# Patient Record
(demographics unavailable — no encounter records)

---

## 2024-11-05 NOTE — ASSESSMENT
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 Mendez on 9/11/24 (most recent 10/22/24)  MRV Head 10/2024 reveals patent stent, no new stenosis  Will Continue Routine Follow Up.    Plan: Continue ASA 325mg Daily MRV Head w/wo in 8/2025 Follow Up with Me After Imaging

## 2024-11-05 NOTE — REASON FOR VISIT
[Follow-Up: _____] : a [unfilled] follow-up visit [Home] : at home, [unfilled] , at the time of the visit. [Medical Office: ([facility])___] : at the medical office located in  [Patient] : the patient [Self] : self

## 2024-11-05 NOTE — HISTORY OF PRESENT ILLNESS
[FreeTextEntry1] : Ms. CHAWLA is a pleasant 23yo female who presents today after successful venous sinus stenting on 8/9/24.  Her pulsatile tinnitus has completely resolved.  Her headaches are much improved from prior to the procedure, some residual.    She saw Dr. Mendez on 10/22/24 and did not have papilledema.    She remains compliant with ASA 325mg daily.

## 2025-06-26 NOTE — PHYSICAL EXAM
[Sclera] : the sclera and conjunctiva were normal [Auscultation Breath Sounds / Voice Sounds] : lungs were clear to auscultation bilaterally [Heart Sounds] : normal S1 and S2 [Heart Sounds Gallop] : no gallops [Murmurs] : no murmurs [Abdomen Soft] : soft [Abdomen Tenderness] : non-tender [Cervical Lymph Nodes Enlarged Posterior Bilaterally] : posterior cervical [Cervical Lymph Nodes Enlarged Anterior Bilaterally] : anterior cervical [Supraclavicular Lymph Nodes Enlarged Bilaterally] : supraclavicular [FreeTextEntry1] : pt able to abduct 1st digit to touch wrist, pt able to flex 5th digit over 90s degrees; elbows extension over 180 degrees, pt able to touch ground w palsm of hands without flexing knees (knees in extension)  [] : no rash [Skin Lesions] : no skin lesions [Oriented To Time, Place, And Person] : oriented to person, place, and time

## 2025-06-26 NOTE — CONSULT LETTER
[Dear  ___] : Dear  [unfilled], [Consult Letter:] : I had the pleasure of evaluating your patient, [unfilled]. [Consult Closing:] : Thank you very much for allowing me to participate in the care of this patient.  If you have any questions, please do not hesitate to contact me. [Sincerely,] : Sincerely, [FreeTextEntry2] : Dr. Jeimy Chapin [address] [place] [address] [FreeTextEntry3] : Miles Patrick MD

## 2025-06-26 NOTE — HISTORY OF PRESENT ILLNESS
[FreeTextEntry1] : 23 F here for consultation  Pt reports mm and joint pains for 2 years.  Pt reports maybe she has stiffness, but very mild. No swelling.  Time of day does not affect intensity of pain. Pt says activities mostly make pain better.  Pt has tried mm relaxer, and steroid packs. Also anti-inflammatories. Pt has done PT, which has not helped.   Pt report positive RHONDA.   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 Pt recently went to genetic counseling. Pt says she is able to dislocate joints. NO easy bruising. NO vascular issues. No visual symptoms  Pt has hx of IIH has gotten surgery for this. Pt reports w dx of this, she got body pain.  Pt has had "random infections"    No fevers, h/a, rashes, hair loss, oral ulcers, epistaxis, sinusitis,  swollen glands, dry mouth, dry eyes, CP, SOB, cough, vision changes, abdominal pain, GERD, n/v/d, blood in stool or urine, focal weakness, sensory loss,  Raynaud's, weight loss.   OB: has not been pregnant

## 2025-06-26 NOTE — ASSESSMENT
[FreeTextEntry1] : 22 y/o F w positive RHONDA =arthralgias, myalgias =stiffness? no swelling =hypermobile; pt w [place] score 7 =reported positive RHONDA  Explained that positive RHONDA is a general test, which is found in high titers in autoimmune dz. Explained that positive RHONDA is not diagnostic of AI dz, and patient wout autoimmune dz can also have high RHONDA. Individuals w high RHONDA are at higher risk of developing AI dz however. Explained that positive RHONDA is a general test, which is found in high titers in autoimmune dz. Explained that positive RHONDA is not diagnostic of AI dz, and patient wout autoimmune dz can also have high RHONDA. Individuals w high RHONDA are at higher risk of developing AI dz however.   I do not think pt has AI or inflammatory condition but will send subserologies.   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 I advised pt to complete genetic counseling (pt currently declining). Explain pt wants to know subtype of hypermobile syndrome to do proper monitoring (vascular subtype?)   Plan -Labs w serologies, inflammatory markers RTO depending on above results